# Patient Record
Sex: FEMALE | Race: WHITE
[De-identification: names, ages, dates, MRNs, and addresses within clinical notes are randomized per-mention and may not be internally consistent; named-entity substitution may affect disease eponyms.]

---

## 2017-11-24 ENCOUNTER — HOSPITAL ENCOUNTER (OUTPATIENT)
Dept: HOSPITAL 76 - LAB.WCP | Age: 57
Discharge: HOME | End: 2017-11-24
Attending: FAMILY MEDICINE
Payer: COMMERCIAL

## 2017-11-24 DIAGNOSIS — Z00.00: Primary | ICD-10-CM

## 2017-11-24 LAB
ALBUMIN/GLOB SERPL: 1.4 {RATIO} (ref 1–2.2)
ANION GAP SERPL CALCULATED.4IONS-SCNC: 7 MMOL/L (ref 6–13)
BASOPHILS NFR BLD AUTO: 0 10^3/UL (ref 0–0.1)
BASOPHILS NFR BLD AUTO: 1 %
BILIRUB BLD-MCNC: 0.4 MG/DL (ref 0.2–1)
BUN SERPL-MCNC: 10 MG/DL (ref 6–20)
CALCIUM UR-MCNC: 8.6 MG/DL (ref 8.5–10.3)
CHLORIDE SERPL-SCNC: 108 MMOL/L (ref 101–111)
CHOLEST SERPL-MCNC: 146 MG/DL
CO2 SERPL-SCNC: 24 MMOL/L (ref 21–32)
CREAT SERPLBLD-SCNC: 0.6 MG/DL (ref 0.4–1)
EOSINOPHIL # BLD AUTO: 0.1 10^3/UL (ref 0–0.7)
EOSINOPHIL NFR BLD AUTO: 3 %
ERYTHROCYTE [DISTWIDTH] IN BLOOD BY AUTOMATED COUNT: 14.2 % (ref 12–15)
EST. AVERAGE GLUCOSE BLD GHB EST-MCNC: 105 MG/DL (ref 70–100)
GFRSERPLBLD MDRD-ARVRAT: 104 ML/MIN/{1.73_M2} (ref 89–?)
GLOBULIN SER-MCNC: 2.7 G/DL (ref 2.1–4.2)
GLUCOSE SERPL-MCNC: 87 MG/DL (ref 70–100)
HBA1C BLD-MCNC: 0.5 G/DL
HCT VFR BLD AUTO: 41.9 % (ref 37–47)
HDLC SERPL-MCNC: 78 MG/DL
HDLC SERPL: 1.9 {RATIO} (ref ?–4.4)
HGB UR QL STRIP: 13.7 G/DL (ref 12–16)
LDLC/HDLC SERPL: 0.8 {RATIO} (ref ?–4.4)
LYMPHOCYTES # SPEC AUTO: 1.1 10^3/UL (ref 1.5–3.5)
LYMPHOCYTES NFR BLD AUTO: 23.5 %
MCH RBC QN AUTO: 28.8 PG (ref 27–31)
MCHC RBC AUTO-ENTMCNC: 32.6 G/DL (ref 32–36)
MCV RBC AUTO: 88.3 FL (ref 81–99)
MONOCYTES # BLD AUTO: 0.5 10^3/UL (ref 0–1)
MONOCYTES NFR BLD AUTO: 10.5 %
NEUTROPHILS # BLD AUTO: 2.9 10^3/UL (ref 1.5–6.6)
NEUTROPHILS # SNV AUTO: 4.6 X10^3/UL (ref 4.8–10.8)
NEUTROPHILS NFR BLD AUTO: 62 %
NRBC # BLD AUTO: 0 /100WBC
PDW BLD AUTO: 8.9 FL (ref 7.9–10.8)
POTASSIUM SERPL-SCNC: 4.1 MMOL/L (ref 3.5–5)
PROT SPEC-MCNC: 6.6 G/DL (ref 6.7–8.2)
RBC MAR: 4.75 10^6/UL (ref 4.2–5.4)
SODIUM SERPLBLD-SCNC: 139 MMOL/L (ref 135–145)
TRIGL P FAST SERPL-MCNC: 46 MG/DL
VLDLC SERPL-SCNC: 9 MG/DL
WBC # BLD: 4.6 X10^3/UL

## 2017-11-24 PROCEDURE — 36415 COLL VENOUS BLD VENIPUNCTURE: CPT

## 2017-11-24 PROCEDURE — 83036 HEMOGLOBIN GLYCOSYLATED A1C: CPT

## 2017-11-24 PROCEDURE — 80053 COMPREHEN METABOLIC PANEL: CPT

## 2017-11-24 PROCEDURE — 80061 LIPID PANEL: CPT

## 2017-11-24 PROCEDURE — 85025 COMPLETE CBC W/AUTO DIFF WBC: CPT

## 2017-11-24 PROCEDURE — 84443 ASSAY THYROID STIM HORMONE: CPT

## 2018-01-03 ENCOUNTER — HOSPITAL ENCOUNTER (OUTPATIENT)
Dept: HOSPITAL 76 - DI | Age: 58
Discharge: HOME | End: 2018-01-03
Attending: FAMILY MEDICINE
Payer: COMMERCIAL

## 2018-01-03 DIAGNOSIS — Z12.31: Primary | ICD-10-CM

## 2018-01-03 PROCEDURE — 77067 SCR MAMMO BI INCL CAD: CPT

## 2018-01-05 NOTE — MAMMOGRAPHY REPORT
DATE OF SERVICE: 01/03/2018

 

DIGITAL BILATERAL SCREENING MAMMOGRAM:  01/03/2018

 

COMPARISON:  Mammogram 07/01/2016.

 

INDICATION:  Screening mammography.

 

TECHNIQUE:   Bilateral CC and MLO breast views.

 

FINDINGS:  The breast parenchyma is heterogeneously dense, which may limit the 
sensitivity of mammography.

 

No dominant mass, architectural distortion, or concerning cluster of 
microcalcifications is seen.

 

IMPRESSION:  BIRADS category:  1, negative.

 

RECOMMENDATION:  Annual screening mammogram.

 

STANDARD QUALIFYING STATEMENTS

1.  This examination was reviewed with the aid of Computed-Aided Detection (CAD)
.

2.  A negative or benign imaging report should not delay biopsy if clinically 
suspicious findings are present.

 Consider surgical consultation if warranted.  More than 5% of cancers are not 
identified by imaging.

3.  Dense breasts may obscure an underlying neoplasm.

 

 

DD: 01/04/2018 08:51

TD: 01/04/2018 18:40

Job #: [id number]

BEVERLEY

## 2018-12-06 ENCOUNTER — HOSPITAL ENCOUNTER (OUTPATIENT)
Dept: HOSPITAL 76 - LAB | Age: 58
Discharge: HOME | End: 2018-12-06
Attending: FAMILY MEDICINE
Payer: COMMERCIAL

## 2018-12-06 DIAGNOSIS — R07.9: ICD-10-CM

## 2018-12-06 DIAGNOSIS — Z00.00: Primary | ICD-10-CM

## 2018-12-06 LAB
ALBUMIN DIAFP-MCNC: 4.6 G/DL (ref 3.2–5.5)
ALBUMIN/GLOB SERPL: 1.7 {RATIO} (ref 1–2.2)
ALP SERPL-CCNC: 95 IU/L (ref 42–121)
ALT SERPL W P-5'-P-CCNC: 14 IU/L (ref 10–60)
ANION GAP SERPL CALCULATED.4IONS-SCNC: 6 MMOL/L (ref 6–13)
AST SERPL W P-5'-P-CCNC: 17 IU/L (ref 10–42)
BASOPHILS NFR BLD AUTO: 0 10^3/UL (ref 0–0.1)
BASOPHILS NFR BLD AUTO: 0.6 %
BILIRUB BLD-MCNC: 0.8 MG/DL (ref 0.2–1)
BUN SERPL-MCNC: 14 MG/DL (ref 6–20)
CALCIUM UR-MCNC: 9.3 MG/DL (ref 8.5–10.3)
CHLORIDE SERPL-SCNC: 104 MMOL/L (ref 101–111)
CHOLEST SERPL-MCNC: 163 MG/DL
CO2 SERPL-SCNC: 28 MMOL/L (ref 21–32)
CREAT SERPLBLD-SCNC: 0.6 MG/DL (ref 0.4–1)
EOSINOPHIL # BLD AUTO: 0.1 10^3/UL (ref 0–0.7)
EOSINOPHIL NFR BLD AUTO: 2.2 %
ERYTHROCYTE [DISTWIDTH] IN BLOOD BY AUTOMATED COUNT: 13.9 % (ref 12–15)
EST. AVERAGE GLUCOSE BLD GHB EST-MCNC: 103 MG/DL (ref 70–100)
GFRSERPLBLD MDRD-ARVRAT: 103 ML/MIN/{1.73_M2} (ref 89–?)
GLOBULIN SER-MCNC: 2.7 G/DL (ref 2.1–4.2)
GLUCOSE SERPL-MCNC: 92 MG/DL (ref 70–100)
HB2 TOTAL: 14.8 G/DL
HBA1C BLD-MCNC: 0.49 G/DL
HDLC SERPL-MCNC: 70 MG/DL
HDLC SERPL: 2.3 {RATIO} (ref ?–4.4)
HEMOGLOBIN A1C %: 5.2 % (ref 4.6–6.2)
HGB UR QL STRIP: 13.7 G/DL (ref 12–16)
LDLC SERPL CALC-MCNC: 82 MG/DL
LDLC/HDLC SERPL: 1.2 {RATIO} (ref ?–4.4)
LYMPHOCYTES # SPEC AUTO: 1.2 10^3/UL (ref 1.5–3.5)
LYMPHOCYTES NFR BLD AUTO: 23.7 %
MCH RBC QN AUTO: 29.7 PG (ref 27–31)
MCHC RBC AUTO-ENTMCNC: 33.6 G/DL (ref 32–36)
MCV RBC AUTO: 88.5 FL (ref 81–99)
MONOCYTES # BLD AUTO: 0.4 10^3/UL (ref 0–1)
MONOCYTES NFR BLD AUTO: 7.4 %
NEUTROPHILS # BLD AUTO: 3.4 10^3/UL (ref 1.5–6.6)
NEUTROPHILS # SNV AUTO: 5.2 X10^3/UL (ref 4.8–10.8)
NEUTROPHILS NFR BLD AUTO: 66.1 %
PDW BLD AUTO: 7.7 FL (ref 7.9–10.8)
PLATELET # BLD: 194 10^3/UL (ref 130–450)
PROT SPEC-MCNC: 7.3 G/DL (ref 6.7–8.2)
RBC MAR: 4.62 10^6/UL (ref 4.2–5.4)
SODIUM SERPLBLD-SCNC: 138 MMOL/L (ref 135–145)
VLDLC SERPL-SCNC: 11 MG/DL

## 2018-12-06 PROCEDURE — 85025 COMPLETE CBC W/AUTO DIFF WBC: CPT

## 2018-12-06 PROCEDURE — 83721 ASSAY OF BLOOD LIPOPROTEIN: CPT

## 2018-12-06 PROCEDURE — 84484 ASSAY OF TROPONIN QUANT: CPT

## 2018-12-06 PROCEDURE — 80061 LIPID PANEL: CPT

## 2018-12-06 PROCEDURE — 80053 COMPREHEN METABOLIC PANEL: CPT

## 2018-12-06 PROCEDURE — 83036 HEMOGLOBIN GLYCOSYLATED A1C: CPT

## 2018-12-06 PROCEDURE — 84443 ASSAY THYROID STIM HORMONE: CPT

## 2018-12-06 PROCEDURE — 36415 COLL VENOUS BLD VENIPUNCTURE: CPT

## 2018-12-14 ENCOUNTER — HOSPITAL ENCOUNTER (OUTPATIENT)
Dept: HOSPITAL 76 - DI | Age: 58
Discharge: HOME | End: 2018-12-14
Attending: FAMILY MEDICINE
Payer: COMMERCIAL

## 2018-12-14 DIAGNOSIS — Z82.49: ICD-10-CM

## 2018-12-14 DIAGNOSIS — I10: ICD-10-CM

## 2018-12-14 DIAGNOSIS — I25.9: Primary | ICD-10-CM

## 2018-12-14 PROCEDURE — 78452 HT MUSCLE IMAGE SPECT MULT: CPT

## 2018-12-14 PROCEDURE — 93017 CV STRESS TEST TRACING ONLY: CPT

## 2018-12-14 NOTE — CARDIAC PROCEDURE NOTE
DATE OF SERVICE: 12/14/2018

Physician: Shirlene Jansen MD, [facility]

 

INDICATION:  Chest pain.

 

CARDIAC RISK FACTORS:  Hypertension, family history of heart disease.

 

SUMMARY:  After signing informed consent, the patient underwent a Lexiscan 
pharmaceutical stress test with nuclear myocardial imaging.

 

Resting heart rate: 76, peak heart rate: 118.

 

Resting blood pressure: 150/84, peak blood pressure: 163/82 (the patient did not
take her Metoprolol earlier this day).

 

Lexiscan was infused per protocol.  The patient developed brief shortness of 
breath, flushing and a headache, which subsided spontaneously at 4 minutes.  The
patient had 2/10 chest pain pretest and it was unchanged throughout the entire 
test and continued after testing was done.

 

RESTING EKG:  Normal sinus rhythm, within normal limits.

 

EKG AT PEAK:  2 mm horizontal ST depressions in leads II, III, AVF, and V3 - V4,
and 1 mm horizontal ST depression in V5 - V6.

 

SUMMARY

1.  Blood pressure poorly controlled on this day because of not taking 
Metoprolol.

2.  Significant ischemia by EKG criteria, on this pharmaceutical stress test.

3.  Atypical chest pain for angina, as it was present pre, during and post 
testing.

4.  Nuclear images reported separately.

 

 

cc: Ashley Redd MD

DD: 12/14/2018 13:55

TD: 12/14/2018 14:01

Job #: [id number]

MTDD

## 2018-12-14 NOTE — NUCLEAR MEDICINE REPORT
Reason:  CHEST PAIN

Procedure Date:  12/14/2018   

Accession Number:  [id number] / [id number]                    

Procedure:  NM  - Myocardial Perfusion STR/RST CPT Code:  

 

FULL RESULT:

 

 

EXAM:

SINGLE-ISOTOPE PHARMACOLOGICAL STRESS TEST WITH REGADENOSON. 

SINGLE-ISOTOPE AND ONE-DAY REST/STRESS MYOCARDIAL PERFUSION SCANS WITH 

TOMOGRAPHIC IMAGING, QUANTITATIVE ANALYSIS, WALL MOTION ANALYSIS AND 

CALCULATION OF EJECTION FRACTION.

 

EXAM DATE: 12/14/2018 03:52 PM.

 

CLINICAL HISTORY: CHEST PAIN.

 

COMPARISON: None available.

 

TECHNIQUE:

After the intravenous administration of 9.9 mCi of Tc-99m sestamibi, a 

rest myocardial perfusion scan was done with tomography. Motion 

correction was applied when appropriate.

 

After an appropriate delay, pharmacological stress was performed with the 

infusion of 0.4 mg regadenoson per protocol. According to protocol, 37 

mCi of Tc-99m sestamibi was injected for stress myocardial perfusion 

scan. Motion correction was applied when appropriate.

Gated tomographic images were obtained for wall motion analysis and 

computation of left ventricular ejection fraction.

 

FINDINGS: On visual analysis, no convincing significant fixed or 

reversible perfusion defects.

 

Computer analysis:

Summed stress score 6

Summed rest score 1

Summed difference score 5

 

Wall motion analysis demonstrates no focal wall motion abnormality.

 

The left ventricular end-diastolic volume is 38 cc. The left ventricular 

end-systolic volume is 4 cc. The left ventricular ejection fraction is 

calculated to be 89%.

IMPRESSION:

1. On visual analysis, no convincing significant fixed or reversible 

perfusion defects.

2. Left ventricular ejection fraction of 89% (probably an overestimate).

3. Normal segmental and global wall motion.

4. Normal left ventricular cavity size, no change with stress.

5. Based on computer analysis, mildly abnormal study with moderate 

ischemia.

 

RADIA

## 2019-01-04 ENCOUNTER — HOSPITAL ENCOUNTER (OUTPATIENT)
Dept: HOSPITAL 76 - LAB | Age: 59
Discharge: HOME | End: 2019-01-04
Attending: FAMILY MEDICINE
Payer: COMMERCIAL

## 2019-01-04 DIAGNOSIS — R07.9: ICD-10-CM

## 2019-01-04 DIAGNOSIS — R06.09: Primary | ICD-10-CM

## 2019-01-04 PROCEDURE — 85651 RBC SED RATE NONAUTOMATED: CPT

## 2019-01-04 PROCEDURE — 86140 C-REACTIVE PROTEIN: CPT

## 2019-01-04 PROCEDURE — 85379 FIBRIN DEGRADATION QUANT: CPT

## 2019-01-04 PROCEDURE — 36415 COLL VENOUS BLD VENIPUNCTURE: CPT

## 2019-02-07 ENCOUNTER — HOSPITAL ENCOUNTER (OUTPATIENT)
Dept: HOSPITAL 76 - DI | Age: 59
Discharge: HOME | End: 2019-02-07
Attending: GENERAL ACUTE CARE HOSPITAL
Payer: COMMERCIAL

## 2019-02-07 DIAGNOSIS — Z12.31: Primary | ICD-10-CM

## 2019-02-07 PROCEDURE — 77063 BREAST TOMOSYNTHESIS BI: CPT

## 2019-02-07 PROCEDURE — 77067 SCR MAMMO BI INCL CAD: CPT

## 2019-02-11 NOTE — MAMMOGRAPHY REPORT
Reason:  Annual Screening

Procedure Date:  02/07/2019   

Accession Number:  [id number] / [id number]                    

Procedure:  JULIANA - Screening Mammo w/Bud CPT Code:  

 

FULL RESULT:

 

 

EXAM: Screening Mammo w/Bud

 

DATE: 2/7/2019 5:20 PM

 

CLINICAL HISTORY: Routine screening

 

TECHNIQUE: Bilateral CC and MLO views were obtained.

 

COMPARISON: 1/3/2018, 7/1/2016, 9/2/2015, 8/20/2015, and 6/12/2014

 

FINDINGS:

There are scattered fibroglandular densities. There has been a 

progressive increase in fatty replacement of the breast tissue.. No new 

suspicious masses, clustered microcalcifications, or regions of 

architectural distortion are identified.

 

IMPRESSION: Benign findings

 

RECOMMENDATION: Routine annual screening unless otherwise clinically 

indicated.

 

BIRADS CATEGORY 2: Benign findings

 

STANDARD QUALIFYING STATEMENTS:

1.  This examination was not reviewed with the aid of Computer-Aided 

Detection (CAD).

2.  A negative or benign  imaging report should not delay biopsy if 

clinically suspicious findings are present.  Consider surgical 

consultation if warrented.  More than 5% of cancers are not identified by 

imaging.

3.  Dense breasts may obscure an underlying neoplasm.

4. This examination was reviewed with the aid of 3D breast imaging 

(tomosynthesis).

## 2019-06-27 ENCOUNTER — HOSPITAL ENCOUNTER (OUTPATIENT)
Dept: HOSPITAL 76 - DI | Age: 59
Discharge: HOME | End: 2019-06-27
Attending: FAMILY MEDICINE
Payer: COMMERCIAL

## 2019-06-27 DIAGNOSIS — N32.89: ICD-10-CM

## 2019-06-27 DIAGNOSIS — I10: ICD-10-CM

## 2019-06-27 DIAGNOSIS — R19.00: ICD-10-CM

## 2019-06-27 DIAGNOSIS — R10.12: Primary | ICD-10-CM

## 2019-06-27 DIAGNOSIS — R07.9: ICD-10-CM

## 2019-06-27 LAB
ALBUMIN DIAFP-MCNC: 4.5 G/DL (ref 3.2–5.5)
ALBUMIN/GLOB SERPL: 1.5 {RATIO} (ref 1–2.2)
ALP SERPL-CCNC: 88 IU/L (ref 42–121)
ALT SERPL W P-5'-P-CCNC: 16 IU/L (ref 10–60)
AMYLASE SERPL-CCNC: 118 U/L (ref 28–100)
ANION GAP SERPL CALCULATED.4IONS-SCNC: 9 MMOL/L (ref 6–13)
AST SERPL W P-5'-P-CCNC: 19 IU/L (ref 10–42)
BASOPHILS NFR BLD AUTO: 0 10^3/UL (ref 0–0.1)
BASOPHILS NFR BLD AUTO: 0.4 %
BILIRUB BLD-MCNC: 0.6 MG/DL (ref 0.2–1)
BUN SERPL-MCNC: 13 MG/DL (ref 6–20)
CALCIUM UR-MCNC: 9.9 MG/DL (ref 8.5–10.3)
CHLORIDE SERPL-SCNC: 104 MMOL/L (ref 101–111)
CO2 SERPL-SCNC: 27 MMOL/L (ref 21–32)
CREAT SERPLBLD-SCNC: 0.6 MG/DL (ref 0.4–1)
EOSINOPHIL # BLD AUTO: 0.2 10^3/UL (ref 0–0.7)
EOSINOPHIL NFR BLD AUTO: 2.1 %
ERYTHROCYTE [DISTWIDTH] IN BLOOD BY AUTOMATED COUNT: 14.3 % (ref 12–15)
GFRSERPLBLD MDRD-ARVRAT: 103 ML/MIN/{1.73_M2} (ref 89–?)
GLOBULIN SER-MCNC: 3 G/DL (ref 2.1–4.2)
GLUCOSE SERPL-MCNC: 87 MG/DL (ref 70–100)
H PYLORI AG STL QL IA.RAPID: NEGATIVE
HGB UR QL STRIP: 14 G/DL (ref 12–16)
LIPASE SERPL-CCNC: 31 U/L (ref 22–51)
LYMPHOCYTES # SPEC AUTO: 1.6 10^3/UL (ref 1.5–3.5)
LYMPHOCYTES NFR BLD AUTO: 22.1 %
MCH RBC QN AUTO: 29 PG (ref 27–31)
MCHC RBC AUTO-ENTMCNC: 31.9 G/DL (ref 32–36)
MCV RBC AUTO: 91.1 FL (ref 81–99)
MONOCYTES # BLD AUTO: 0.6 10^3/UL (ref 0–1)
MONOCYTES NFR BLD AUTO: 8.4 %
NEUTROPHILS # BLD AUTO: 4.7 10^3/UL (ref 1.5–6.6)
NEUTROPHILS # SNV AUTO: 7 X10^3/UL (ref 4.8–10.8)
NEUTROPHILS NFR BLD AUTO: 66.9 %
PDW BLD AUTO: 10.1 FL (ref 7.9–10.8)
PLATELET # BLD: 175 10^3/UL (ref 130–450)
PROT SPEC-MCNC: 7.5 G/DL (ref 6.7–8.2)
RBC MAR: 4.82 10^6/UL (ref 4.2–5.4)
SODIUM SERPLBLD-SCNC: 140 MMOL/L (ref 135–145)

## 2019-06-27 PROCEDURE — 85025 COMPLETE CBC W/AUTO DIFF WBC: CPT

## 2019-06-27 PROCEDURE — 83690 ASSAY OF LIPASE: CPT

## 2019-06-27 PROCEDURE — 80053 COMPREHEN METABOLIC PANEL: CPT

## 2019-06-27 PROCEDURE — 87338 HPYLORI STOOL AG IA: CPT

## 2019-06-27 PROCEDURE — 36415 COLL VENOUS BLD VENIPUNCTURE: CPT

## 2019-06-27 PROCEDURE — 82150 ASSAY OF AMYLASE: CPT

## 2019-06-27 PROCEDURE — 74177 CT ABD & PELVIS W/CONTRAST: CPT

## 2019-06-27 NOTE — CT REPORT
Reason:  ABDOMINAL PAIN,LEFT UPPER QUADRANT,PULSATILE ABODM

Procedure Date:  06/27/2019   

Accession Number:  [id number] / [id number]                    

Procedure:  CT  - Abdomen/Pelvis W CPT Code:  

 

FULL RESULT:

 

 

EXAM:

CT ABDOMEN AND PELVIS

 

EXAM DATE: 6/27/2019 12:04 PM.

 

CLINICAL HISTORY: Abdominal pain, left upper quadrant, pulsatile abdomen.

 

COMPARISONS: None.

 

TECHNIQUE: Routine helical CT imaging was performed through the abdomen 

and pelvis. IV contrast: 100 mL Omnipaque 320. Enteric contrast: Yes. 

Reconstructions: Coronal and sagittal.

 

In accordance with CT protocol optimization, one or more of the following 

dose reduction techniques were utilized for this exam: automated exposure 

control, adjustment of mA and/or KV based on patient size, or use of 

iterative reconstructive technique.

 

FINDINGS:

Lung Bases: Unremarkable.

 

Liver: Normal. No masses.

 

Gallbladder/Bile Ducts: Unremarkable.

 

Spleen: Normal.

 

Pancreas: Normal.

 

Adrenal Glands: Normal.

 

Kidneys: Normal. No masses or hydronephrosis.

 

Peritoneal Cavity/Bowel: There is no bowel obstruction. There is no free 

fluid or free air. There is no lymphadenopathy. The appendix is not 

visualized; however, there are no inflammatory changes in the region of 

the cecum and there is no nearby mesenteric lymphadenopathy.

 

Pelvic Organs: The bladder is markedly distended. The bladder and 

visualized pelvic organs are within normal limits.

 

Vasculature: No aneurysms or other significant abnormality.

 

Bones: No significant abnormality.

 

Other: None.

IMPRESSION:

No evidence of abdominal aortic aneurysm.

 

Markedly distended urinary bladder.

 

RADIA

## 2019-10-02 ENCOUNTER — HOSPITAL ENCOUNTER (OUTPATIENT)
Dept: HOSPITAL 76 - SDS | Age: 59
Discharge: HOME | End: 2019-10-02
Attending: SURGERY
Payer: COMMERCIAL

## 2019-10-02 VITALS — DIASTOLIC BLOOD PRESSURE: 65 MMHG | SYSTOLIC BLOOD PRESSURE: 111 MMHG

## 2019-10-02 DIAGNOSIS — K44.9: Primary | ICD-10-CM

## 2019-10-02 DIAGNOSIS — K22.0: ICD-10-CM

## 2019-10-02 DIAGNOSIS — K21.9: ICD-10-CM

## 2019-10-02 DIAGNOSIS — I10: ICD-10-CM

## 2019-10-02 PROCEDURE — 43239 EGD BIOPSY SINGLE/MULTIPLE: CPT

## 2019-10-02 PROCEDURE — 0DB98ZX EXCISION OF DUODENUM, VIA NATURAL OR ARTIFICIAL OPENING ENDOSCOPIC, DIAGNOSTIC: ICD-10-PCS | Performed by: SURGERY

## 2019-10-02 PROCEDURE — 0DB68ZX EXCISION OF STOMACH, VIA NATURAL OR ARTIFICIAL OPENING ENDOSCOPIC, DIAGNOSTIC: ICD-10-PCS | Performed by: SURGERY

## 2020-01-16 ENCOUNTER — HOSPITAL ENCOUNTER (OUTPATIENT)
Dept: HOSPITAL 76 - LAB | Age: 60
Discharge: HOME | End: 2020-01-16
Attending: STUDENT IN AN ORGANIZED HEALTH CARE EDUCATION/TRAINING PROGRAM
Payer: COMMERCIAL

## 2020-01-16 DIAGNOSIS — G35: Primary | ICD-10-CM

## 2020-01-16 LAB
CLARITY UR REFRACT.AUTO: CLEAR
GLUCOSE UR QL STRIP.AUTO: NEGATIVE MG/DL
KETONES UR QL STRIP.AUTO: NEGATIVE MG/DL
NITRITE UR QL STRIP.AUTO: NEGATIVE
PH UR STRIP.AUTO: 6 PH (ref 5–7.5)
PROT UR STRIP.AUTO-MCNC: NEGATIVE MG/DL
RBC # UR STRIP.AUTO: (no result) /UL
SP GR UR STRIP.AUTO: 1.02 (ref 1–1.03)
UROBILINOGEN UR QL STRIP.AUTO: (no result) E.U./DL
UROBILINOGEN UR STRIP.AUTO-MCNC: NEGATIVE MG/DL

## 2020-01-16 PROCEDURE — 81003 URINALYSIS AUTO W/O SCOPE: CPT

## 2020-01-16 PROCEDURE — 81001 URINALYSIS AUTO W/SCOPE: CPT

## 2020-01-16 PROCEDURE — 87086 URINE CULTURE/COLONY COUNT: CPT

## 2020-01-30 ENCOUNTER — HOSPITAL ENCOUNTER (OUTPATIENT)
Dept: HOSPITAL 76 - LAB | Age: 60
Discharge: HOME | End: 2020-01-30
Attending: STUDENT IN AN ORGANIZED HEALTH CARE EDUCATION/TRAINING PROGRAM
Payer: COMMERCIAL

## 2020-01-30 DIAGNOSIS — G35: Primary | ICD-10-CM

## 2020-01-30 LAB
CLARITY UR REFRACT.AUTO: CLEAR
GLUCOSE UR QL STRIP.AUTO: NEGATIVE MG/DL
KETONES UR QL STRIP.AUTO: NEGATIVE MG/DL
NITRITE UR QL STRIP.AUTO: NEGATIVE
PH UR STRIP.AUTO: 6.5 PH (ref 5–7.5)
PROT UR STRIP.AUTO-MCNC: NEGATIVE MG/DL
RBC # UR STRIP.AUTO: (no result) /UL
SP GR UR STRIP.AUTO: <=1.005 (ref 1–1.03)
SQUAMOUS URNS QL MICRO: (no result)
UROBILINOGEN UR QL STRIP.AUTO: (no result) E.U./DL
UROBILINOGEN UR STRIP.AUTO-MCNC: NEGATIVE MG/DL
WBC CLUMPS URNS QL MICRO: PRESENT

## 2020-01-30 PROCEDURE — 81001 URINALYSIS AUTO W/SCOPE: CPT

## 2020-01-30 PROCEDURE — 87086 URINE CULTURE/COLONY COUNT: CPT

## 2020-06-18 ENCOUNTER — HOSPITAL ENCOUNTER (OUTPATIENT)
Dept: HOSPITAL 76 - LAB | Age: 60
Discharge: HOME | End: 2020-06-18
Attending: PSYCHIATRY & NEUROLOGY
Payer: COMMERCIAL

## 2020-06-18 DIAGNOSIS — G35: Primary | ICD-10-CM

## 2020-06-18 LAB
BASOPHILS NFR BLD AUTO: 0 10^3/UL (ref 0–0.1)
BASOPHILS NFR BLD AUTO: 0.5 %
EOSINOPHIL # BLD AUTO: 0.2 10^3/UL (ref 0–0.7)
EOSINOPHIL NFR BLD AUTO: 2.8 %
ERYTHROCYTE [DISTWIDTH] IN BLOOD BY AUTOMATED COUNT: 13.8 % (ref 12–15)
HGB UR QL STRIP: 13.7 G/DL (ref 12–16)
LYMPHOCYTES # SPEC AUTO: 1.3 10^3/UL (ref 1.5–3.5)
LYMPHOCYTES NFR BLD AUTO: 22.4 %
MCH RBC QN AUTO: 30.3 PG (ref 27–31)
MCHC RBC AUTO-ENTMCNC: 33.2 G/DL (ref 32–36)
MCV RBC AUTO: 91.4 FL (ref 81–99)
MONOCYTES # BLD AUTO: 0.6 10^3/UL (ref 0–1)
MONOCYTES NFR BLD AUTO: 10.7 %
NEUTROPHILS # BLD AUTO: 3.8 10^3/UL (ref 1.5–6.6)
NEUTROPHILS # SNV AUTO: 6 X10^3/UL (ref 4.8–10.8)
NEUTROPHILS NFR BLD AUTO: 63.3 %
PDW BLD AUTO: 9.9 FL (ref 7.9–10.8)
PLATELET # BLD: 202 10^3/UL (ref 130–450)
RBC MAR: 4.52 10^6/UL (ref 4.2–5.4)

## 2020-06-18 PROCEDURE — 86359 T CELLS TOTAL COUNT: CPT

## 2020-06-18 PROCEDURE — 86360 T CELL ABSOLUTE COUNT/RATIO: CPT

## 2020-06-18 PROCEDURE — 81599 UNLISTED MAAA: CPT

## 2020-06-18 PROCEDURE — 86355 B CELLS TOTAL COUNT: CPT

## 2020-06-18 PROCEDURE — 86357 NK CELLS TOTAL COUNT: CPT

## 2020-06-18 PROCEDURE — 85025 COMPLETE CBC W/AUTO DIFF WBC: CPT

## 2020-06-18 PROCEDURE — 82784 ASSAY IGA/IGD/IGG/IGM EACH: CPT

## 2020-06-18 PROCEDURE — 36415 COLL VENOUS BLD VENIPUNCTURE: CPT

## 2020-09-11 ENCOUNTER — HOSPITAL ENCOUNTER (OUTPATIENT)
Dept: HOSPITAL 76 - DI.N | Age: 60
Discharge: HOME | End: 2020-09-11
Attending: GENERAL ACUTE CARE HOSPITAL
Payer: COMMERCIAL

## 2020-09-11 DIAGNOSIS — Z12.31: Primary | ICD-10-CM

## 2020-09-11 PROCEDURE — 77067 SCR MAMMO BI INCL CAD: CPT

## 2020-09-11 PROCEDURE — 77063 BREAST TOMOSYNTHESIS BI: CPT

## 2020-09-14 NOTE — MAMMOGRAPHY REPORT
BILATERAL DIGITAL SCREENING MAMMOGRAM 3D/2D: 9/11/2020

 

CLINICAL: Routine screening.  

 

Comparison is made to exams dated:  1/3/2018 mammogram, 2/7/2019 mammogram - [facility], 8/20/2015 mammogram - [facility], and 7/1/2016 mammogram - [facility].  The tissue of both breasts is heterogeneously dense. This may lower the sens
itivity of mammography.  

 

No significant masses, calcifications, or other findings are seen in either breast.  

There has been no significant interval change.

 

IMPRESSION: NEGATIVE

There is no mammographic evidence of malignancy. A 1 year screening mammogram is recommended.  

 

 

This exam was interpreted at Station ID: [id number].  

 

NOTE: For mammograms, a report in lay terms will be sent to the patient. Approximately 15% of breast 
malignancies will not be visualized mammographically. In the management of a palpable breast mass, a 
negative mammogram must not discourage biopsy of a clinically suspicious lesion.

 

Electronically Signed By: Jayden hi/fab:9/11/2020 16:57:14  

 

 

 

ACR BI-RADS Category 1: Negative 3341F

PARENCHYMAL PATTERN: (D) - The breast(s) demonstrate(s) heterogeneously dense fibroglandular bridget lucas.

BI-RADS CATEGORY: (1) - 1

RECOMMENDATION: (ANNUAL)  - Recommend routine annual screening mammography.

[id number]

1 year screening

LATERALITY: (B)

## 2020-12-09 ENCOUNTER — HOSPITAL ENCOUNTER (OUTPATIENT)
Dept: HOSPITAL 76 - LAB | Age: 60
Discharge: HOME | End: 2020-12-09
Attending: PSYCHIATRY & NEUROLOGY
Payer: COMMERCIAL

## 2020-12-09 DIAGNOSIS — G35: Primary | ICD-10-CM

## 2020-12-09 PROCEDURE — 82784 ASSAY IGA/IGD/IGG/IGM EACH: CPT

## 2020-12-09 PROCEDURE — 36415 COLL VENOUS BLD VENIPUNCTURE: CPT

## 2020-12-09 PROCEDURE — 81599 UNLISTED MAAA: CPT

## 2020-12-09 PROCEDURE — 86357 NK CELLS TOTAL COUNT: CPT

## 2020-12-09 PROCEDURE — 86359 T CELLS TOTAL COUNT: CPT

## 2020-12-09 PROCEDURE — 86355 B CELLS TOTAL COUNT: CPT

## 2020-12-09 PROCEDURE — 86360 T CELL ABSOLUTE COUNT/RATIO: CPT

## 2021-01-05 ENCOUNTER — HOSPITAL ENCOUNTER (OUTPATIENT)
Dept: HOSPITAL 76 - LAB | Age: 61
Discharge: HOME | End: 2021-01-05
Attending: PSYCHIATRY & NEUROLOGY
Payer: COMMERCIAL

## 2021-01-05 DIAGNOSIS — G35: Primary | ICD-10-CM

## 2021-01-05 LAB
BASOPHILS NFR BLD AUTO: 0 10^3/UL (ref 0–0.1)
BASOPHILS NFR BLD AUTO: 0.6 %
EOSINOPHIL # BLD AUTO: 0.2 10^3/UL (ref 0–0.7)
EOSINOPHIL NFR BLD AUTO: 2.5 %
ERYTHROCYTE [DISTWIDTH] IN BLOOD BY AUTOMATED COUNT: 13.6 % (ref 12–15)
HGB UR QL STRIP: 14.1 G/DL (ref 12–16)
LYMPHOCYTES # SPEC AUTO: 1.8 10^3/UL (ref 1.5–3.5)
LYMPHOCYTES NFR BLD AUTO: 27.5 %
MCH RBC QN AUTO: 30.3 PG (ref 27–31)
MCHC RBC AUTO-ENTMCNC: 32.9 G/DL (ref 32–36)
MCV RBC AUTO: 92.1 FL (ref 81–99)
MONOCYTES # BLD AUTO: 0.5 10^3/UL (ref 0–1)
MONOCYTES NFR BLD AUTO: 7.7 %
NEUTROPHILS # BLD AUTO: 4 10^3/UL (ref 1.5–6.6)
NEUTROPHILS # SNV AUTO: 6.5 X10^3/UL (ref 4.8–10.8)
NEUTROPHILS NFR BLD AUTO: 61.5 %
PDW BLD AUTO: 9.9 FL (ref 7.9–10.8)
PLATELET # BLD: 200 10^3/UL (ref 130–450)
RBC MAR: 4.66 10^6/UL (ref 4.2–5.4)

## 2021-01-05 PROCEDURE — 85025 COMPLETE CBC W/AUTO DIFF WBC: CPT

## 2023-01-10 ENCOUNTER — HOSPITAL ENCOUNTER (EMERGENCY)
Dept: HOSPITAL 76 - ED | Age: 63
Discharge: HOME | End: 2023-01-10
Payer: COMMERCIAL

## 2023-01-10 VITALS — SYSTOLIC BLOOD PRESSURE: 130 MMHG | DIASTOLIC BLOOD PRESSURE: 80 MMHG
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
## 2023-01-10 DIAGNOSIS — I10: ICD-10-CM

## 2023-01-10 DIAGNOSIS — J95.830: Primary | ICD-10-CM

## 2023-01-10 LAB
APTT PPP: 29.3 SECS (ref 24.9–33.3)
BASOPHILS NFR BLD AUTO: 0.1 10^3/UL (ref 0–0.1)
BASOPHILS NFR BLD AUTO: 0.6 %
EOSINOPHIL # BLD AUTO: 0.2 10^3/UL (ref 0–0.7)
EOSINOPHIL NFR BLD AUTO: 1.9 %
ERYTHROCYTE [DISTWIDTH] IN BLOOD BY AUTOMATED COUNT: 13.2 % (ref 12–15)
HCT VFR BLD AUTO: 43.1 % (ref 37–47)
HGB UR QL STRIP: 14.1 G/DL (ref 12–16)
INR PPP: 1.1 (ref 0.8–1.2)
LYMPHOCYTES # SPEC AUTO: 1.7 10^3/UL (ref 1.5–3.5)
LYMPHOCYTES NFR BLD AUTO: 16.8 %
MCH RBC QN AUTO: 30.5 PG (ref 27–31)
MCHC RBC AUTO-ENTMCNC: 32.7 G/DL (ref 32–36)
MCV RBC AUTO: 93.3 FL (ref 81–99)
MONOCYTES # BLD AUTO: 0.5 10^3/UL (ref 0–1)
MONOCYTES NFR BLD AUTO: 5.1 %
NEUTROPHILS # BLD AUTO: 7.6 10^3/UL (ref 1.5–6.6)
NEUTROPHILS # SNV AUTO: 10.1 X10^3/UL (ref 4.8–10.8)
NEUTROPHILS NFR BLD AUTO: 75 %
NRBC # BLD AUTO: 0 /100WBC
NRBC # BLD AUTO: 0 X10^3/UL
PDW BLD AUTO: 9.6 FL (ref 7.9–10.8)
PLATELET # BLD: 193 10^3/UL (ref 130–450)
PROTHROM ACT/NOR PPP: 12 SECS (ref 9.9–12.6)
RBC MAR: 4.62 10^6/UL (ref 4.2–5.4)

## 2023-01-10 PROCEDURE — 85730 THROMBOPLASTIN TIME PARTIAL: CPT

## 2023-01-10 PROCEDURE — 99282 EMERGENCY DEPT VISIT SF MDM: CPT

## 2023-01-10 PROCEDURE — 36415 COLL VENOUS BLD VENIPUNCTURE: CPT

## 2023-01-10 PROCEDURE — 85610 PROTHROMBIN TIME: CPT

## 2023-01-10 PROCEDURE — 99283 EMERGENCY DEPT VISIT LOW MDM: CPT

## 2023-01-10 PROCEDURE — 85025 COMPLETE CBC W/AUTO DIFF WBC: CPT

## 2023-01-10 NOTE — ED PHYSICIAN DOCUMENTATION
History of Present Illness





- Stated complaint


Stated Complaint: NOSE BLEED POST SURG





- Chief complaint


Chief Complaint: Heent





- History obtained from


History obtained from: Patient





- Additonal information


Additional information: 





62-year-old female here for nasal bleeding.  She is status post septoplasty on 5 January.  She had been recovering normally until today when she developed 

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  She denies 

coughing sneezing or attempting to blow her nose.  She had a difficult time 

getting the bleeding to slow but eventually slowed by using a nasal clamp as 

well as gauze compress anteriorly.  She is followed by Dr. Vicente ENT with [facility].

He is scheduled to see him in follow-up on the 12th








Review of Systems


Constitutional: reports: Reviewed and negative


Nose: reports: Epistaxis


Throat: reports: Reviewed and negative


Cardiac: reports: Reviewed and negative


Respiratory: reports: Reviewed and negative


GI: reports: Reviewed and negative





PD PAST MEDICAL HISTORY





- Past Medical History


Cardiovascular: Hypertension


Respiratory: None


Endocrine/Autoimmune: None


GI: None


: None


HEENT: None


Psych: Depression


Musculoskeletal: None


Derm: None





- Past Surgical History


General: Colonoscopy





- Present Medications


Home Medications: 


                                Ambulatory Orders











 Medication  Instructions  Recorded  Confirmed


 


DULoxetine [Cymbalta] 1 tab ORAL DAILY 10/02/19 10/02/19


 


atenoloL [Atenolol] 1 tab ORAL DAILY 10/02/19 10/02/19














- Allergies


Allergies/Adverse Reactions: 


                                    Allergies











Allergy/AdvReac Type Severity Reaction Status Date / Time


 


No Known Drug Allergies Allergy   Verified 01/10/23 17:19














PD ED PE NORMAL





- General


General: Alert and oriented X 3, No acute distress, Well developed/nourished





- HEENT


HEENT: Atraumatic, Other (Large gelatinous clot seen in posterior oropharynx 

though no active bleeding.  There is a large maroon clot extending from the 

right nares and dried smaller clot in the left nares.  When posterior pharynx 

exam is examined no active bleeding is noted)





Results





- Vitals


Vitals: 





                               Vital Signs - 24 hr











  01/10/23 01/10/23 01/10/23





  17:21 18:40 20:00


 


Temperature 36.3 C L  36.5 C


 


Heart Rate 77 70 70


 


Respiratory 18 16 16





Rate   


 


Blood Pressure 155/100 H 134/79 H 130/80


 


O2 Saturation 100 99 100








                                     Oxygen











O2 Source                      Room air

















- Labs


Labs: 





                                Laboratory Tests











  01/10/23 01/10/23





  17:59 17:59


 


WBC  10.1 


 


RBC  4.62 


 


Hgb  14.1 


 


Hct  43.1 


 


MCV  93.3 


 


MCH  30.5 


 


MCHC  32.7 


 


RDW  13.2 


 


Plt Count  193 


 


MPV  9.6 


 


Neut # (Auto)  7.6 H 


 


Lymph # (Auto)  1.7 


 


Mono # (Auto)  0.5 


 


Eos # (Auto)  0.2 


 


Baso # (Auto)  0.1 


 


Absolute Nucleated RBC  0.00 


 


Nucleated RBC %  0.0 


 


PT   12.0


 


INR   1.1


 


APTT   29.3














PD Medical Decision Making





- ED course


Complexity details: considered differential, d/w patient, d/w consultant 

(Bhavya)


ED course: 





62-year-old female presents emergency department for evaluation of epistaxis in 

the setting of recent septoplasty.  She is scheduled to see her ENT in less than

 48 hours time.  On exam she has a large clot in the right nares and a smaller 

clot in the left nares.  There is a large clot in the posterior oropharynx 

however there was no active bleeding noted.  We were able to get much of the 

bleeding controlled by putting pressure with gauze anteriorly as well as using a

 nasal clamp.





I did speak with Dr. Latif the ENT on-call with [facility].  Given that the 

bleeding is mostly controlled and she has unremarkable labs for anemia.  No 

hypotension or tachycardia she is clinically stable to follow-up tomorrow in the

 ENT clinic with her surgeon.  A message will be sent to her surgeon.  I 

discussed this plan with the patient and her  and she is agreeable.  We 

also discussed the usual emergent return precautions for worsening symptoms





Departure





- Departure


Disposition: 01 Home, Self Care


Clinical Impression: 


 Nasal bleeding, Status post nasal septoplasty





Comments: 


Pam montoya came to the emergency department today because you began having 

nasal bleeding after recent septoplasty.  We did check your CBC and coagulation 

panels and they are normal.





You do have a large clot in the right anterior nose as well as a larger 1 in 

your posterior oropharynx.  We have elected not to remove these clots as they m

ay actually be tamponading some of the bleeding.





I spoke with Dr. Latif the ear nose throat doctor on-call for [facility].  He will 

send a message to Dr. Vicente clinic to request you be seen in follow-up tomorrow.
Render Post-Care Instructions In Note?: no
Detail Level: Detailed
Consent: The patient's consent was obtained including but not limited to risks of crusting, scabbing, blistering, scarring, darker or lighter pigmentary change, recurrence, incomplete removal and infection.
Post-Care Instructions: I reviewed with the patient in detail post-care instructions. Patient is to wear sunprotection, and avoid picking at any of the treated lesions. Pt may apply Vaseline to crusted or scabbing areas.
Number Of Freeze-Thaw Cycles: 1 freeze-thaw cycle
Show Applicator Variable?: Yes
Duration Of Freeze Thaw-Cycle (Seconds): 8